# Patient Record
Sex: FEMALE | URBAN - METROPOLITAN AREA
[De-identification: names, ages, dates, MRNs, and addresses within clinical notes are randomized per-mention and may not be internally consistent; named-entity substitution may affect disease eponyms.]

---

## 2017-05-30 ENCOUNTER — IMPORTED ENCOUNTER (OUTPATIENT)
Dept: URBAN - METROPOLITAN AREA CLINIC 59 | Facility: CLINIC | Age: 77
End: 2017-05-30

## 2017-05-30 PROBLEM — H26.493 OTHER SECONDARY CATARACT, BILATERAL: Noted: 2017-05-30

## 2017-05-30 PROBLEM — H40.11X1 PRIMARY OPEN-ANGLE GLAUCOMA, MILD STAGE: Noted: 2017-05-30

## 2017-05-30 PROBLEM — H43.811 VITREOUS DETACHMENT OF RT EYE: Noted: 2017-05-30

## 2017-05-30 PROBLEM — H35.371 PUCKERING OF MACULA, RIGHT EYE: Noted: 2017-05-30

## 2017-05-30 PROCEDURE — 92012 INTRM OPH EXAM EST PATIENT: CPT

## 2017-05-30 PROCEDURE — 92133 CPTRZD OPH DX IMG PST SGM ON: CPT

## 2017-11-20 ENCOUNTER — IMPORTED ENCOUNTER (OUTPATIENT)
Dept: URBAN - METROPOLITAN AREA CLINIC 59 | Facility: CLINIC | Age: 77
End: 2017-11-20

## 2017-11-20 PROBLEM — H40.1131 PRIMARY OPEN-ANGLE GLAUCOMA, BILATERAL, MILD STAGE: Noted: 2017-11-20

## 2017-11-20 PROBLEM — H26.493 OTHER SECONDARY CATARACT, BILATERAL: Noted: 2017-11-20

## 2017-11-20 PROBLEM — H35.371 PUCKERING OF MACULA, RIGHT EYE: Noted: 2017-11-20

## 2017-11-20 PROCEDURE — 92014 COMPRE OPH EXAM EST PT 1/>: CPT

## 2017-11-20 PROCEDURE — 92083 EXTENDED VISUAL FIELD XM: CPT

## 2018-05-21 ENCOUNTER — IMPORTED ENCOUNTER (OUTPATIENT)
Dept: URBAN - METROPOLITAN AREA CLINIC 59 | Facility: CLINIC | Age: 78
End: 2018-05-21

## 2018-05-21 PROBLEM — H35.371 PUCKERING OF MACULA, RIGHT EYE: Noted: 2018-05-21

## 2018-05-21 PROBLEM — H26.493 OTHER SECONDARY CATARACT, BILATERAL: Noted: 2018-05-21

## 2018-05-21 PROBLEM — H40.1131 PRIMARY OPEN-ANGLE GLAUCOMA, BILATERAL, MILD STAGE: Noted: 2018-05-21

## 2018-05-21 PROCEDURE — 92012 INTRM OPH EXAM EST PATIENT: CPT

## 2018-05-21 PROCEDURE — 92133 CPTRZD OPH DX IMG PST SGM ON: CPT

## 2018-11-19 ENCOUNTER — IMPORTED ENCOUNTER (OUTPATIENT)
Dept: URBAN - METROPOLITAN AREA CLINIC 59 | Facility: CLINIC | Age: 78
End: 2018-11-19

## 2018-11-19 PROBLEM — H35.371 PUCKERING OF MACULA, RIGHT EYE: Noted: 2018-11-19

## 2018-11-19 PROBLEM — H40.1131 PRIMARY OPEN-ANGLE GLAUCOMA, BILATERAL, MILD STAGE: Noted: 2018-11-19

## 2018-11-19 PROBLEM — H26.493 OTHER SECONDARY CATARACT, BILATERAL: Noted: 2018-11-19

## 2018-11-19 PROBLEM — H43.813 VITREOUS DEGENERATION, BILATERAL: Noted: 2018-11-19

## 2018-11-19 PROCEDURE — 92014 COMPRE OPH EXAM EST PT 1/>: CPT

## 2018-11-19 PROCEDURE — 92083 EXTENDED VISUAL FIELD XM: CPT

## 2019-05-31 ENCOUNTER — IMPORTED ENCOUNTER (OUTPATIENT)
Dept: URBAN - METROPOLITAN AREA CLINIC 59 | Facility: CLINIC | Age: 79
End: 2019-05-31

## 2019-05-31 PROBLEM — H35.371 PUCKERING OF MACULA, RIGHT EYE: Noted: 2019-05-31

## 2019-05-31 PROBLEM — H40.1131 PRIMARY OPEN-ANGLE GLAUCOMA, BILATERAL, MILD STAGE: Noted: 2019-05-31

## 2019-05-31 PROBLEM — H26.493 OTHER SECONDARY CATARACT, BILATERAL: Noted: 2019-05-31

## 2019-05-31 PROCEDURE — 92133 CPTRZD OPH DX IMG PST SGM ON: CPT

## 2019-05-31 PROCEDURE — 92012 INTRM OPH EXAM EST PATIENT: CPT

## 2019-07-26 ENCOUNTER — IMPORTED ENCOUNTER (OUTPATIENT)
Dept: URBAN - METROPOLITAN AREA CLINIC 59 | Facility: CLINIC | Age: 79
End: 2019-07-26

## 2019-07-26 PROBLEM — H26.493 OTHER SECONDARY CATARACT, BILATERAL: Noted: 2019-07-26

## 2019-07-26 PROBLEM — H35.371 PUCKERING OF MACULA, RIGHT EYE: Noted: 2019-07-26

## 2019-07-26 PROBLEM — H40.1131 PRIMARY OPEN-ANGLE GLAUCOMA, BILATERAL, MILD STAGE: Noted: 2019-07-26

## 2019-07-26 PROCEDURE — 92012 INTRM OPH EXAM EST PATIENT: CPT

## 2019-12-13 ENCOUNTER — IMPORTED ENCOUNTER (OUTPATIENT)
Dept: URBAN - METROPOLITAN AREA CLINIC 59 | Facility: CLINIC | Age: 79
End: 2019-12-13

## 2019-12-13 PROBLEM — H26.493 OTHER SECONDARY CATARACT, BILATERAL: Noted: 2019-12-13

## 2019-12-13 PROBLEM — H40.1131 PRIMARY OPEN-ANGLE GLAUCOMA, BILATERAL, MILD STAGE: Noted: 2019-12-13

## 2019-12-13 PROBLEM — H35.371 PUCKERING OF MACULA, RIGHT EYE: Noted: 2019-12-13

## 2019-12-13 PROCEDURE — 92012 INTRM OPH EXAM EST PATIENT: CPT

## 2020-06-19 ENCOUNTER — IMPORTED ENCOUNTER (OUTPATIENT)
Dept: URBAN - METROPOLITAN AREA CLINIC 59 | Facility: CLINIC | Age: 80
End: 2020-06-19

## 2020-06-19 PROBLEM — H40.1131 PRIMARY OPEN-ANGLE GLAUCOMA, BILATERAL, MILD STAGE: Noted: 2020-06-19

## 2020-06-19 PROBLEM — H26.493 OTHER SECONDARY CATARACT, BILATERAL: Noted: 2020-06-19

## 2020-06-19 PROBLEM — H35.371 PUCKERING OF MACULA, RIGHT EYE: Noted: 2020-06-19

## 2020-06-19 PROCEDURE — 92133 CPTRZD OPH DX IMG PST SGM ON: CPT

## 2020-06-19 PROCEDURE — 92014 COMPRE OPH EXAM EST PT 1/>: CPT

## 2020-06-19 PROCEDURE — 92083 EXTENDED VISUAL FIELD XM: CPT

## 2023-10-15 ASSESSMENT — VISUAL ACUITY
OS_SC: 20/20
OS_SC: 20/20
OD_SC: 20/20
OD_SC: 20/25+
OD_SC: 20/20-2
OS_SC: 20/25
OD_SC: 20/20-2
OS_SC: 20/20-2
OD_SC: 20/20
OD_SC: 20/20-2
OS_SC: 20/25+2
OD_SC: 20/25+2
OS_SC: 20/25-2
OS_SC: 20/20
OD_SC: 20/20
OS_SC: 20/20

## 2023-10-15 ASSESSMENT — TONOMETRY
OS_IOP_MMHG: 12
OS_IOP_MMHG: 12
OD_IOP_MMHG: 12
OD_IOP_MMHG: 10
OS_IOP_MMHG: 11
OS_IOP_MMHG: 14
OD_IOP_MMHG: 12
OS_IOP_MMHG: 12
OD_IOP_MMHG: 10
OS_IOP_MMHG: 16
OD_IOP_MMHG: 10
OD_IOP_MMHG: 10
OD_IOP_MMHG: 12
OS_IOP_MMHG: 11

## 2023-10-15 ASSESSMENT — PACHYMETRY
OS_CT_UM: C:  FINAL: 0.000; P:
OD_CT_UM: C:  FINAL: 0.000; P:
OS_CT_UM: C:  FINAL: 0.000; P:
OD_CT_UM: C:  FINAL: 0.000; P:

## 2023-12-21 ENCOUNTER — OFFICE (OUTPATIENT)
Dept: URBAN - METROPOLITAN AREA CLINIC 9 | Facility: CLINIC | Age: 83
End: 2023-12-21

## 2023-12-21 VITALS
RESPIRATION RATE: 13 BRPM | HEART RATE: 64 BPM | DIASTOLIC BLOOD PRESSURE: 42 MMHG | OXYGEN SATURATION: 97 % | HEIGHT: 64 IN | SYSTOLIC BLOOD PRESSURE: 103 MMHG | WEIGHT: 178 LBS

## 2023-12-21 DIAGNOSIS — I50.9 HEART FAILURE, UNSPECIFIED: ICD-10-CM

## 2023-12-21 DIAGNOSIS — D64.9 ANEMIA, UNSPECIFIED: ICD-10-CM

## 2023-12-21 PROCEDURE — 99203 OFFICE O/P NEW LOW 30 MIN: CPT | Performed by: INTERNAL MEDICINE

## 2023-12-21 NOTE — SERVICEHPINOTES
Aniya Owen   is a  83   year old  female  who is seen here today for a first visit. She is seen in consultation for  Barb Pitts  .    pt is here for VINAYAK, requiring iron infusion, denies BRBPR

## 2023-12-22 LAB
CBC WITH DIFFERENTIAL/PLATELET: BASO (ABSOLUTE): 0.1 X10E3/UL (ref 0–0.2)
CBC WITH DIFFERENTIAL/PLATELET: BASOS: 1 %
CBC WITH DIFFERENTIAL/PLATELET: EOS (ABSOLUTE): 0.2 X10E3/UL (ref 0–0.4)
CBC WITH DIFFERENTIAL/PLATELET: EOS: 2 %
CBC WITH DIFFERENTIAL/PLATELET: HEMATOCRIT: 33.1 % — LOW (ref 34–46.6)
CBC WITH DIFFERENTIAL/PLATELET: HEMOGLOBIN: 10.6 G/DL — LOW (ref 11.1–15.9)
CBC WITH DIFFERENTIAL/PLATELET: IMMATURE GRANS (ABS): 0 X10E3/UL (ref 0–0.1)
CBC WITH DIFFERENTIAL/PLATELET: IMMATURE GRANULOCYTES: 0 %
CBC WITH DIFFERENTIAL/PLATELET: LYMPHS (ABSOLUTE): 1.7 X10E3/UL (ref 0.7–3.1)
CBC WITH DIFFERENTIAL/PLATELET: LYMPHS: 18 %
CBC WITH DIFFERENTIAL/PLATELET: MCH: 31.5 PG (ref 26.6–33)
CBC WITH DIFFERENTIAL/PLATELET: MCHC: 32 G/DL (ref 31.5–35.7)
CBC WITH DIFFERENTIAL/PLATELET: MCV: 99 FL — HIGH (ref 79–97)
CBC WITH DIFFERENTIAL/PLATELET: MONOCYTES(ABSOLUTE): 0.9 X10E3/UL (ref 0.1–0.9)
CBC WITH DIFFERENTIAL/PLATELET: MONOCYTES: 10 %
CBC WITH DIFFERENTIAL/PLATELET: NEUTROPHILS (ABSOLUTE): 6.6 X10E3/UL (ref 1.4–7)
CBC WITH DIFFERENTIAL/PLATELET: NEUTROPHILS: 69 %
CBC WITH DIFFERENTIAL/PLATELET: PLATELETS: 240 X10E3/UL (ref 150–450)
CBC WITH DIFFERENTIAL/PLATELET: RBC: 3.36 X10E6/UL — LOW (ref 3.77–5.28)
CBC WITH DIFFERENTIAL/PLATELET: RDW: 13.4 % (ref 11.7–15.4)
CBC WITH DIFFERENTIAL/PLATELET: WBC: 9.5 X10E3/UL (ref 3.4–10.8)
FE+TIBC+FER: FERRITIN: 532 NG/ML — HIGH (ref 15–150)
FE+TIBC+FER: IRON BIND.CAP.(TIBC): 237 UG/DL — LOW (ref 250–450)
FE+TIBC+FER: IRON SATURATION: 24 % (ref 15–55)
FE+TIBC+FER: IRON: 58 UG/DL (ref 27–139)
FE+TIBC+FER: UIBC: 179 UG/DL (ref 118–369)